# Patient Record
Sex: MALE | URBAN - METROPOLITAN AREA
[De-identification: names, ages, dates, MRNs, and addresses within clinical notes are randomized per-mention and may not be internally consistent; named-entity substitution may affect disease eponyms.]

---

## 2017-09-12 ENCOUNTER — OFFICE VISIT (OUTPATIENT)
Dept: FAMILY MEDICINE CLINIC | Facility: CLINIC | Age: 42
End: 2017-09-12

## 2017-09-12 VITALS
RESPIRATION RATE: 18 BRPM | WEIGHT: 290 LBS | DIASTOLIC BLOOD PRESSURE: 92 MMHG | BODY MASS INDEX: 38.43 KG/M2 | OXYGEN SATURATION: 97 % | SYSTOLIC BLOOD PRESSURE: 132 MMHG | TEMPERATURE: 98 F | HEART RATE: 69 BPM | HEIGHT: 73 IN

## 2017-09-12 DIAGNOSIS — R20.3 SENSITIVE SKIN: ICD-10-CM

## 2017-09-12 DIAGNOSIS — R03.0 ELEVATED BLOOD PRESSURE READING: ICD-10-CM

## 2017-09-12 DIAGNOSIS — N48.29 FORESKIN INFLAMMATION: ICD-10-CM

## 2017-09-12 DIAGNOSIS — R30.0 DYSURIA: ICD-10-CM

## 2017-09-12 DIAGNOSIS — N48.1 BALANITIS: Primary | ICD-10-CM

## 2017-09-12 DIAGNOSIS — R81 GLUCOSE FOUND IN URINE ON EXAMINATION: ICD-10-CM

## 2017-09-12 DIAGNOSIS — R31.9 HEMATURIA, UNSPECIFIED TYPE: ICD-10-CM

## 2017-09-12 PROBLEM — I10 HYPERTENSION: Status: ACTIVE | Noted: 2017-09-12

## 2017-09-12 PROBLEM — E11.9 TYPE 2 DIABETES MELLITUS (HCC): Status: ACTIVE | Noted: 2017-09-12

## 2017-09-12 LAB
APPEARANCE: CLEAR
BILIRUBIN: NEGATIVE
GLUCOSE (URINE DIPSTICK): 500 MG/DL
KETONES (URINE DIPSTICK): NEGATIVE MG/DL
LEUKOCYTES: NEGATIVE
MULTISTIX LOT#: ABNORMAL NUMERIC
NITRITE, URINE: NEGATIVE
PH, URINE: 5.5 (ref 4.5–8)
POC GLUCOSE: 147
PROTEIN (URINE DIPSTICK): 100 MG/DL
SPECIFIC GRAVITY: 1.02 (ref 1–1.03)
URINE-COLOR: YELLOW
UROBILINOGEN,SEMI-QN: 0.2 MG/DL (ref 0–1.9)

## 2017-09-12 PROCEDURE — 81003 URINALYSIS AUTO W/O SCOPE: CPT | Performed by: NURSE PRACTITIONER

## 2017-09-12 PROCEDURE — 87086 URINE CULTURE/COLONY COUNT: CPT | Performed by: NURSE PRACTITIONER

## 2017-09-12 PROCEDURE — 99202 OFFICE O/P NEW SF 15 MIN: CPT | Performed by: NURSE PRACTITIONER

## 2017-09-12 RX ORDER — CLOTRIMAZOLE 1 %
1 CREAM (GRAM) TOPICAL 2 TIMES DAILY
Qty: 1 TUBE | Refills: 0 | Status: SHIPPED | OUTPATIENT
Start: 2017-09-12 | End: 2017-09-19

## 2017-09-12 RX ORDER — LISINOPRIL 10 MG/1
10 TABLET ORAL
Refills: 11 | COMMUNITY
Start: 2017-07-23

## 2017-09-12 RX ORDER — DULAGLUTIDE 1.5 MG/.5ML
1.5 INJECTION, SOLUTION SUBCUTANEOUS WEEKLY
Refills: 1 | COMMUNITY
Start: 2017-07-31

## 2017-09-12 NOTE — PATIENT INSTRUCTIONS
Follow up with your PCP within 3 days due to blood in urine and for balanitis. Your blood glucose medication may need to be adjusted. Also follow up with your PCP due to elevated blood pressure. If worsening in next one to two days go to ER. · You can't return the retracted foreskin to the forward position. This requires immediate attention!   · Your symptoms get worse  · You have partial or complete blockage of the flow of urine  Date Last Reviewed: 9/1/2016  © 4023-8366 [employer], Follow up with your healthcare provider, or as advised. If you were injured and had blood in your urine, you should have a repeat urine test in 1 to 2 days. Contact your doctor for this test.  A radiologist will review any X-rays that were taken.  You will Talk to your pediatrician regarding the use of this medicine in children. While this drug has been used in young children for selected conditions, precautions do apply. What side effects may I notice from receiving this medicine?   Side effects that usuall If you are using this medicine for athlete's foot be sure to dry your feet carefully after bathing, especially between the toes. Do not wear socks made from wool or synthetic materials like tee or nylon.  Wear clean cotton socks and change them at least o

## 2017-09-12 NOTE — PROGRESS NOTES
CHIEF COMPLAINT:   Patient presents with:  UTI: dysuria - X 2 weeks, no fever, no n/v      HPI:   Zenaida Snow is a 36year old male who presents with symptoms of UTI. Complaining of urinary dysuria for last 2 weeks.  Pt describes it more as sensitivity Smokeless tobacco: Never Used                      Alcohol use: Yes              Comment: socially on weekends        REVIEW OF SYSTEMS:   GENERAL: Denies fever, chills, or body aches  SKIN: no rashes, no skin wounds or ulcers.   EYES:denies blurred visio Dysuria  Hematuria, unspecified type  Elevated blood pressure reading  Glucose found in urine on examination  Foreskin inflammation    PLAN: Meds as listed below. Comfort measures as described in Patient Instructions. Will send urine culture.  Pt will be shweta Also follow up with your PCP due to elevated blood pressure. If worsening in next one to two days go to ER. Balanitis    Balanitis is an inflammation of the head of the penis.  It can happen because of a buildup of germs (bacteria, viruses, Date Last Reviewed: 9/1/2016  © 0588-0355 The [address] [place] [address], [address] [place], [place] [address]. All rights reserved. This information is not intended as a substitute for professional medical care.  Always follow your healthcare professional' Call your healthcare provider right away if any of these occur:  · Bright red blood or blood clots in the urine (if you did not have this before)  · Weakness, dizziness or fainting  · New groin, abdominal, or back pain  · Fever of 100.4ºF (38ºC) or higher, · allergic reactions like skin rash, itching or hives, swelling of the face, lips, or tongue  · skin irritation, burning  What may interact with this medicine? · amphotericin b  · topical products that have nystatin  What if I miss a dose?   If you miss a The patient indicates understanding of these issues and agrees to the plan. The patient is asked to return in 3 days if not better. Call if fever, vomiting, worsening symptoms.

## 2017-09-13 ENCOUNTER — TELEPHONE (OUTPATIENT)
Dept: FAMILY MEDICINE CLINIC | Facility: CLINIC | Age: 42
End: 2017-09-13

## 2017-09-13 LAB
C TRACH DNA SPEC QL NAA+PROBE: NEGATIVE
N GONORRHOEA DNA SPEC QL NAA+PROBE: NEGATIVE

## 2017-09-13 NOTE — TELEPHONE ENCOUNTER
Reported GC lab results to patient, informed patient urine culture results are still pending.   Patient had no questions or concerns at this time,

## 2017-09-14 ENCOUNTER — TELEPHONE (OUTPATIENT)
Dept: FAMILY MEDICINE CLINIC | Facility: CLINIC | Age: 42
End: 2017-09-14

## 2017-09-14 NOTE — TELEPHONE ENCOUNTER
Reported WNL to patient. Patient reports he is feeling better and has an appointment to see his PCP. Instructed patient to continue care discussed at visit and follow up with questions or concerns.  Patient agreed with plan and verbalized understanding of a

## 2024-10-31 ENCOUNTER — OFFICE VISIT (OUTPATIENT)
Dept: FAMILY MEDICINE CLINIC | Facility: CLINIC | Age: 49
End: 2024-10-31

## 2024-10-31 VITALS
DIASTOLIC BLOOD PRESSURE: 80 MMHG | HEIGHT: 72 IN | SYSTOLIC BLOOD PRESSURE: 118 MMHG | BODY MASS INDEX: 36.57 KG/M2 | HEART RATE: 63 BPM | WEIGHT: 270 LBS

## 2024-10-31 DIAGNOSIS — I10 ESSENTIAL HYPERTENSION: ICD-10-CM

## 2024-10-31 DIAGNOSIS — E66.812 CLASS 2 OBESITY DUE TO EXCESS CALORIES WITHOUT SERIOUS COMORBIDITY WITH BODY MASS INDEX (BMI) OF 36.0 TO 36.9 IN ADULT: ICD-10-CM

## 2024-10-31 DIAGNOSIS — E11.9 TYPE 2 DIABETES MELLITUS WITHOUT COMPLICATION, WITHOUT LONG-TERM CURRENT USE OF INSULIN (HCC): Primary | ICD-10-CM

## 2024-10-31 DIAGNOSIS — Z12.11 SCREENING FOR MALIGNANT NEOPLASM OF COLON: ICD-10-CM

## 2024-10-31 DIAGNOSIS — E66.09 CLASS 2 OBESITY DUE TO EXCESS CALORIES WITHOUT SERIOUS COMORBIDITY WITH BODY MASS INDEX (BMI) OF 36.0 TO 36.9 IN ADULT: ICD-10-CM

## 2024-10-31 DIAGNOSIS — Z12.5 SCREENING FOR MALIGNANT NEOPLASM OF PROSTATE: ICD-10-CM

## 2024-10-31 PROBLEM — E11.65 TYPE 2 DIABETES MELLITUS WITH HYPERGLYCEMIA, WITHOUT LONG-TERM CURRENT USE OF INSULIN (HCC): Status: ACTIVE | Noted: 2024-10-31

## 2024-10-31 PROBLEM — E11.65 TYPE 2 DIABETES MELLITUS WITH HYPERGLYCEMIA, WITHOUT LONG-TERM CURRENT USE OF INSULIN (HCC): Status: RESOLVED | Noted: 2024-10-31 | Resolved: 2024-10-31

## 2024-10-31 LAB — HEMOGLOBIN A1C: 7.1 % (ref 4.3–5.6)

## 2024-10-31 PROCEDURE — 3008F BODY MASS INDEX DOCD: CPT | Performed by: PHYSICIAN ASSISTANT

## 2024-10-31 PROCEDURE — 3074F SYST BP LT 130 MM HG: CPT | Performed by: PHYSICIAN ASSISTANT

## 2024-10-31 PROCEDURE — 3051F HG A1C>EQUAL 7.0%<8.0%: CPT | Performed by: PHYSICIAN ASSISTANT

## 2024-10-31 PROCEDURE — 3079F DIAST BP 80-89 MM HG: CPT | Performed by: PHYSICIAN ASSISTANT

## 2024-10-31 PROCEDURE — 83036 HEMOGLOBIN GLYCOSYLATED A1C: CPT | Performed by: PHYSICIAN ASSISTANT

## 2024-10-31 PROCEDURE — 99203 OFFICE O/P NEW LOW 30 MIN: CPT | Performed by: PHYSICIAN ASSISTANT

## 2024-10-31 RX ORDER — TAMSULOSIN HYDROCHLORIDE 0.4 MG/1
0.4 CAPSULE ORAL DAILY
Qty: 90 CAPSULE | Refills: 1 | Status: SHIPPED | OUTPATIENT
Start: 2024-10-31 | End: 2025-01-29

## 2024-10-31 RX ORDER — TIRZEPATIDE 5 MG/.5ML
5 INJECTION, SOLUTION SUBCUTANEOUS
Qty: 6 ML | Refills: 1 | Status: SHIPPED | OUTPATIENT
Start: 2024-10-31 | End: 2024-10-31

## 2024-10-31 RX ORDER — INSULIN DEGLUDEC 200 U/ML
30 INJECTION, SOLUTION SUBCUTANEOUS
COMMUNITY
Start: 2024-10-31

## 2024-10-31 RX ORDER — TIRZEPATIDE 7.5 MG/.5ML
7.5 INJECTION, SOLUTION SUBCUTANEOUS
Qty: 6 ML | Refills: 1 | Status: SHIPPED | OUTPATIENT
Start: 2024-10-31 | End: 2025-01-29

## 2024-10-31 NOTE — PROGRESS NOTES
HPI:     HPI  A 49-year-old male is in the office for established care. The patient is doing fine at this time. The patient denies chest pain, SOB, N/V/C/D, fever, dizziness, syncope, and abdominal pain. There are no other concerns today.    Medications:     Current Outpatient Medications   Medication Sig Dispense Refill    tamsulosin (FLOMAX) 0.4 MG Oral Cap Take 1 capsule (0.4 mg total) by mouth daily. 90 capsule 1    Tirzepatide (MOUNJARO) 7.5 MG/0.5ML Subcutaneous Solution Auto-injector Inject 7.5 mg into the skin every 7 days. 6 mL 1    insulin degludec (TRESIBA FLEXTOUCH) 200 UNIT/ML Subcutaneous Solution Pen-injector Inject 30 Units into the skin daily with breakfast.      lisinopril 10 MG Oral Tab Take 1 tablet (10 mg total) by mouth once daily.  11    Dapagliflozin-Metformin HCl ER (XIGDUO XR)  MG Oral Tablet 24 Hr Take 1 tablet by mouth daily.         Allergies:   Allergies[1]    History:     Health Maintenance   Topic Date Due    Diabetes Care Foot Exam  Never done    Diabetes Care A1C  Never done    Diabetes Care Dilated Eye Exam  Never done    Colorectal Cancer Screening  Never done    Diabetes Care: GFR  Never done    Diabetes Care: Microalb/Creat Ratio  Never done    Annual Physical  Never done    Pneumococcal Vaccine: Birth to 64yrs (1 of 2 - PCV) Never done    PSA  Never done    Zoster Vaccines (1 of 2) Never done    COVID-19 Vaccine (1 - 2023-24 season) Never done    Influenza Vaccine (1) 10/01/2024    DTaP,Tdap,and Td Vaccines (2 - Td or Tdap) 02/16/2026    Annual Depression Screening  Completed       No LMP for male patient.   Past Medical History:     Past Medical History:    Diabetes (HCC)    Essential hypertension       Past Surgical History:     Past Surgical History:   Procedure Laterality Date    Cholecystectomy         Family History:     Family History   Problem Relation Age of Onset    Heart Attack Father        Social History:     Social History     Socioeconomic History     Marital status:      Spouse name: Not on file    Number of children: Not on file    Years of education: Not on file    Highest education level: Not on file   Occupational History    Not on file   Tobacco Use    Smoking status: Never    Smokeless tobacco: Never   Vaping Use    Vaping status: Not on file   Substance and Sexual Activity    Alcohol use: Yes     Comment: socially on weekends    Drug use: No    Sexual activity: Not on file   Other Topics Concern    Caffeine Concern Not Asked    Exercise Not Asked    Seat Belt Not Asked    Special Diet Not Asked    Stress Concern Not Asked    Weight Concern Not Asked   Social History Narrative    Not on file     Social Drivers of Health     Financial Resource Strain: Not on file   Food Insecurity: Not on file   Transportation Needs: Not on file   Physical Activity: Not on file   Stress: Not on file   Social Connections: Not on file   Housing Stability: Not on file       Review of Systems:   Review of Systems   Constitutional:  Negative for activity change, chills, fatigue and fever.   HENT:  Negative for congestion, ear discharge, ear pain, postnasal drip, rhinorrhea, sinus pressure, sinus pain and sore throat.    Respiratory:  Negative for cough, chest tightness, shortness of breath and wheezing.    Cardiovascular:  Negative for chest pain and palpitations.   Gastrointestinal:  Negative for abdominal distention, abdominal pain, blood in stool, constipation, diarrhea, nausea and vomiting.   Skin:  Negative for rash.        Vitals:    10/31/24 0900   BP: 118/80   Pulse: 63   Weight: 270 lb (122.5 kg)   Height: 6' (1.829 m)     Body mass index is 36.62 kg/m².    Physical Exam:   Physical Exam  Vitals reviewed.   Cardiovascular:      Rate and Rhythm: Normal rate and regular rhythm.      Heart sounds: Normal heart sounds.   Pulmonary:      Effort: Pulmonary effort is normal.      Breath sounds: Normal breath sounds.          Assessment and Plan::     Problem List Items  Addressed This Visit          HCC Problems    Type 2 diabetes mellitus without complication, without long-term current use of insulin (HCC) - Primary    Relevant Medications    Tirzepatide (MOUNJARO) 7.5 MG/0.5ML Subcutaneous Solution Auto-injector    insulin degludec (TRESIBA FLEXTOUCH) 200 UNIT/ML Subcutaneous Solution Pen-injector  Discussed HbA1C result with the patient. Continue with Xigduo every day. Increase Mounjaro 7.5 mg Qweek. Decrease Tresiba: 30 units QAM.         Cardiac and Vasculature    Essential hypertension    Relevant Medications    Stable. Continue current management.         Endocrine and Metabolic    Class 2 obesity due to excess calories without serious comorbidity with body mass index (BMI) of 36.0 to 36.9 in adult    Relevant Medications            Other Relevant Orders    CBC With Differential With Platelet    Comp Metabolic Panel (14)    Lipid Panel    PSA Total, Screen    TSH W Reflex To Free T4    Microalb/Creat Ratio, Random Urine     Other Visit Diagnoses       Screening for malignant neoplasm of colon        Relevant Orders    Gastro GI Telephone Colon Screen    Screening for malignant neoplasm of prostate        Relevant Orders    PSA Total, Screen          Discussed plan of care with pt and pt is in agreement.All questions answered. Pt to call with questions or concerns.         [1]   Allergies  Allergen Reactions    Shrimp ANAPHYLAXIS

## 2024-11-02 ENCOUNTER — LAB ENCOUNTER (OUTPATIENT)
Dept: LAB | Age: 49
End: 2024-11-02
Attending: PHYSICIAN ASSISTANT
Payer: COMMERCIAL

## 2024-11-02 DIAGNOSIS — E66.09 CLASS 2 OBESITY DUE TO EXCESS CALORIES WITHOUT SERIOUS COMORBIDITY WITH BODY MASS INDEX (BMI) OF 36.0 TO 36.9 IN ADULT: ICD-10-CM

## 2024-11-02 DIAGNOSIS — R74.8 ELEVATED LIVER ENZYMES: ICD-10-CM

## 2024-11-02 DIAGNOSIS — Z12.5 SCREENING FOR MALIGNANT NEOPLASM OF PROSTATE: ICD-10-CM

## 2024-11-02 DIAGNOSIS — E11.9 TYPE 2 DIABETES MELLITUS WITHOUT COMPLICATION, WITHOUT LONG-TERM CURRENT USE OF INSULIN (HCC): ICD-10-CM

## 2024-11-02 DIAGNOSIS — E66.812 CLASS 2 OBESITY DUE TO EXCESS CALORIES WITHOUT SERIOUS COMORBIDITY WITH BODY MASS INDEX (BMI) OF 36.0 TO 36.9 IN ADULT: ICD-10-CM

## 2024-11-02 LAB
ALBUMIN SERPL-MCNC: 4.1 G/DL (ref 3.2–4.8)
ALBUMIN/GLOB SERPL: 1 {RATIO} (ref 1–2)
ALP LIVER SERPL-CCNC: 98 U/L
ALT SERPL-CCNC: 78 U/L
ANION GAP SERPL CALC-SCNC: 4 MMOL/L (ref 0–18)
AST SERPL-CCNC: 53 U/L (ref ?–34)
BASOPHILS # BLD AUTO: 0.11 X10(3) UL (ref 0–0.2)
BASOPHILS NFR BLD AUTO: 1.4 %
BILIRUB SERPL-MCNC: 0.7 MG/DL (ref 0.3–1.2)
BUN BLD-MCNC: 12 MG/DL (ref 9–23)
BUN/CREAT SERPL: 14.5 (ref 10–20)
CALCIUM BLD-MCNC: 9.7 MG/DL (ref 8.7–10.4)
CHLORIDE SERPL-SCNC: 107 MMOL/L (ref 98–112)
CHOLEST SERPL-MCNC: 180 MG/DL (ref ?–200)
CO2 SERPL-SCNC: 28 MMOL/L (ref 21–32)
COMPLEXED PSA SERPL-MCNC: 0.33 NG/ML (ref ?–4)
CREAT BLD-MCNC: 0.83 MG/DL
CREAT UR-SCNC: 80.5 MG/DL
DEPRECATED RDW RBC AUTO: 43.9 FL (ref 35.1–46.3)
EGFRCR SERPLBLD CKD-EPI 2021: 107 ML/MIN/1.73M2 (ref 60–?)
EOSINOPHIL # BLD AUTO: 0.39 X10(3) UL (ref 0–0.7)
EOSINOPHIL NFR BLD AUTO: 5 %
ERYTHROCYTE [DISTWIDTH] IN BLOOD BY AUTOMATED COUNT: 12.3 % (ref 11–15)
FASTING PATIENT LIPID ANSWER: YES
FASTING STATUS PATIENT QL REPORTED: YES
GLOBULIN PLAS-MCNC: 4.2 G/DL (ref 2–3.5)
GLUCOSE BLD-MCNC: 136 MG/DL (ref 70–99)
HCT VFR BLD AUTO: 49.8 %
HDLC SERPL-MCNC: 32 MG/DL (ref 40–59)
HGB BLD-MCNC: 17 G/DL
IMM GRANULOCYTES # BLD AUTO: 0.03 X10(3) UL (ref 0–1)
IMM GRANULOCYTES NFR BLD: 0.4 %
LDLC SERPL CALC-MCNC: 124 MG/DL (ref ?–100)
LYMPHOCYTES # BLD AUTO: 2.07 X10(3) UL (ref 1–4)
LYMPHOCYTES NFR BLD AUTO: 26.7 %
MCH RBC QN AUTO: 32.9 PG (ref 26–34)
MCHC RBC AUTO-ENTMCNC: 34.1 G/DL (ref 31–37)
MCV RBC AUTO: 96.5 FL
MICROALBUMIN UR-MCNC: 0.9 MG/DL
MICROALBUMIN/CREAT 24H UR-RTO: 11.2 UG/MG (ref ?–30)
MONOCYTES # BLD AUTO: 0.64 X10(3) UL (ref 0.1–1)
MONOCYTES NFR BLD AUTO: 8.3 %
NEUTROPHILS # BLD AUTO: 4.5 X10 (3) UL (ref 1.5–7.7)
NEUTROPHILS # BLD AUTO: 4.5 X10(3) UL (ref 1.5–7.7)
NEUTROPHILS NFR BLD AUTO: 58.2 %
NONHDLC SERPL-MCNC: 148 MG/DL (ref ?–130)
OSMOLALITY SERPL CALC.SUM OF ELEC: 290 MOSM/KG (ref 275–295)
PLATELET # BLD AUTO: 219 10(3)UL (ref 150–450)
POTASSIUM SERPL-SCNC: 4.5 MMOL/L (ref 3.5–5.1)
PROT SERPL-MCNC: 8.3 G/DL (ref 5.7–8.2)
RBC # BLD AUTO: 5.16 X10(6)UL
SODIUM SERPL-SCNC: 139 MMOL/L (ref 136–145)
TRIGL SERPL-MCNC: 131 MG/DL (ref 30–149)
TSI SER-ACNC: 0.6 UIU/ML (ref 0.55–4.78)
VLDLC SERPL CALC-MCNC: 23 MG/DL (ref 0–30)
WBC # BLD AUTO: 7.7 X10(3) UL (ref 4–11)

## 2024-11-02 PROCEDURE — 82043 UR ALBUMIN QUANTITATIVE: CPT

## 2024-11-02 PROCEDURE — 80061 LIPID PANEL: CPT

## 2024-11-02 PROCEDURE — 85025 COMPLETE CBC W/AUTO DIFF WBC: CPT

## 2024-11-02 PROCEDURE — 82570 ASSAY OF URINE CREATININE: CPT

## 2024-11-02 PROCEDURE — 36415 COLL VENOUS BLD VENIPUNCTURE: CPT

## 2024-11-02 PROCEDURE — 86706 HEP B SURFACE ANTIBODY: CPT

## 2024-11-02 PROCEDURE — 87340 HEPATITIS B SURFACE AG IA: CPT

## 2024-11-02 PROCEDURE — 86803 HEPATITIS C AB TEST: CPT

## 2024-11-02 PROCEDURE — 80503 PATH CLIN CONSLTJ SF 5-20: CPT

## 2024-11-02 PROCEDURE — 86708 HEPATITIS A ANTIBODY: CPT

## 2024-11-02 PROCEDURE — 84443 ASSAY THYROID STIM HORMONE: CPT

## 2024-11-02 PROCEDURE — 80053 COMPREHEN METABOLIC PANEL: CPT

## 2024-11-02 PROCEDURE — 86704 HEP B CORE ANTIBODY TOTAL: CPT

## 2024-11-03 LAB
HAV AB SER QL IA: NONREACTIVE
HBV CORE AB SERPL QL IA: NONREACTIVE
HBV SURFACE AB SER QL: NONREACTIVE
HBV SURFACE AB SERPL IA-ACNC: <3.1 MIU/ML
HBV SURFACE AG SERPL QL IA: NONREACTIVE
HCV AB SERPL QL IA: NONREACTIVE

## 2024-11-06 ENCOUNTER — NURSE ONLY (OUTPATIENT)
Facility: CLINIC | Age: 49
End: 2024-11-06

## 2024-11-06 DIAGNOSIS — Z12.11 COLON CANCER SCREENING: Primary | ICD-10-CM

## 2024-11-06 NOTE — PROGRESS NOTES
Called patient for TCS.   Please advise on colonoscopy and bowel prep orders.   Medications, pharmacy, and allergies reviewed.     Age 45-74 y/o:   › MD preference: none  › Insurance:  BCBS PPO  › Last PCP visit: 10/31/2024 Ignacia Barnett  › Last CBC: 11/02/2024  › H/W/BMI: 6'/270/36.62    Special comments/notes:    Telephone Colon Screening Questionnaire Yes No   Are you currently experiencing any new/abnormal GI symptoms? [] [x]   If yes, explain:     Rectal bleeding? [] [x]   Black stool? [] [x]   Dysphagia or food \"feeling stuck\" when eating? [] [x]   Intractable vomiting? [] [x]   Unexplained weight loss? [] [x]   First colonoscopy? [x] []   Family history of colon cancer? [] [x]   Any issues with anesthesia? [] [x]   If yes, explain:      Any recent complaints related to chest pain &/or shortness of breath?  [] [x]   Were you referred to a cardiologist?  [x] []   If yes, explain: Had a stress test 2 years ago that was normal     History of  respiratory issues/oxygen/GASPER/COPD: [] [x]   CPAP/BiPAP:     History of devices (pacemaker/defibrillator) [] [x]   History of heart attack &/or stroke?  [] [x]   If yes, in the last 12 months? Stent placement?  [] []     Medications Yes  No   Anticoagulants (except Aspirin):  [] [x]   Diabetic Medication Xigduo, Mounjaro Tresiba [x] []   Weight loss meds (phentermine/vyvanse/saxsenda/etc): [] [x]   Iron/herbal/multivitamin supplement: [] [x]   Usage of marijuana, CBD &/or vape products: [] [x]

## 2024-11-06 NOTE — PROGRESS NOTES
1. Schedule colonoscopy with General Pool Endoscopist  Diagnosis: CRC screening  Sedation: MAC  Prep: split dose golytely    2. MEDICATION CHANGES PRIOR TO PROCEDURE    *HOLD xigduo for 4 days prior to procedure  *HOLD mounjaro for 1 week prior to procedure  If you are prescribed insulin please reach out to your endocrine provider (or primary care provider) for insulin dosing prior to procedure. Failure to adjust insulin can result in procedure cancellations.

## 2024-11-07 NOTE — PROGRESS NOTES
Scheduled for:  Colonoscopy [id number]  Provider Name:    Date:  Thursday, 03/13/2025  Location:  [facility]   Sedation:  Mac  Time:  215pm (pt is aware [facility] will call with arrival time     Prep:  Golytely   Meds/Allergies Reconciled?:  Yes    Diagnosis with codes:  CRC screening Z12.11  Was patient informed to call insurance with codes (Y/N):  Yes     Referral sent?:  Referral was sent at the time of electronic surgical scheduling.    [facility] or [facility] notified?:  I sent an electronic request to Endo Scheduling and received a confirmation today.       Medication Orders:  *HOLD xigduo for 4 days prior to procedure  *HOLD mounjaro for 1 week prior to procedure  If you are prescribed insulin please reach out to your endocrine provider (or primary care provider) for insulin dosing prior to procedure. Failure to adjust insulin can result in procedure cancellations.     Misc Orders:  None     Further instructions given by staff:  I discussed the prep instructions with the patient which he verbally understood and is aware that I will send the instructions today.via [employer]

## 2025-01-23 ENCOUNTER — OFFICE VISIT (OUTPATIENT)
Dept: FAMILY MEDICINE CLINIC | Facility: CLINIC | Age: 50
End: 2025-01-23

## 2025-01-23 VITALS
BODY MASS INDEX: 38.47 KG/M2 | HEIGHT: 72 IN | SYSTOLIC BLOOD PRESSURE: 113 MMHG | HEART RATE: 80 BPM | WEIGHT: 284 LBS | DIASTOLIC BLOOD PRESSURE: 78 MMHG

## 2025-01-23 DIAGNOSIS — L03.818 CELLULITIS OF OTHER SPECIFIED SITE: Primary | ICD-10-CM

## 2025-01-23 PROCEDURE — 3074F SYST BP LT 130 MM HG: CPT | Performed by: FAMILY MEDICINE

## 2025-01-23 PROCEDURE — 3008F BODY MASS INDEX DOCD: CPT | Performed by: FAMILY MEDICINE

## 2025-01-23 PROCEDURE — 99213 OFFICE O/P EST LOW 20 MIN: CPT | Performed by: FAMILY MEDICINE

## 2025-01-23 PROCEDURE — 3078F DIAST BP <80 MM HG: CPT | Performed by: FAMILY MEDICINE

## 2025-01-23 RX ORDER — CEPHALEXIN 500 MG/1
500 CAPSULE ORAL 4 TIMES DAILY
Qty: 28 CAPSULE | Refills: 0 | Status: SHIPPED | OUTPATIENT
Start: 2025-01-23 | End: 2025-01-30

## 2025-01-23 NOTE — PROGRESS NOTES
HPI:   Hill Gandhi is a 49 year old male who presents for:     Patient presents with:    Recurrent/intermittent skin redness and pain suprapubic area on the right, occurred in the area where he had a reaction to topical, he has been using betamethasone cream given to him in the past for reaction to belt buckle, he states that he has not worn a belt buckle for a long time due to his reaction, he has not been seen for this otherwise including he has not been on antibiotic or treated for infection, no fever or chills, no discharge          See ROS below for other pertinent positive and negative complaints.    I reviewed his's Past Medical History, Past Surgical History, Family and Social History    Labs:   Lab Results   Component Value Date/Time    WBC 7.7 11/02/2024 10:06 AM    HGB 17.0 11/02/2024 10:06 AM    .0 11/02/2024 10:06 AM      Lab Results   Component Value Date/Time     (H) 11/02/2024 10:06 AM     11/02/2024 10:06 AM    K 4.5 11/02/2024 10:06 AM     11/02/2024 10:06 AM    CO2 28.0 11/02/2024 10:06 AM    CREATSERUM 0.83 11/02/2024 10:06 AM    CA 9.7 11/02/2024 10:06 AM    ALB 4.1 11/02/2024 10:06 AM    TP 8.3 (H) 11/02/2024 10:06 AM    ALKPHO 98 11/02/2024 10:06 AM    AST 53 (H) 11/02/2024 10:06 AM    ALT 78 (H) 11/02/2024 10:06 AM    BILT 0.7 11/02/2024 10:06 AM    TSH 0.602 11/02/2024 10:06 AM        Lab Results   Component Value Date/Time    CHOLEST 180 11/02/2024 10:06 AM    HDL 32 (L) 11/02/2024 10:06 AM    TRIG 131 11/02/2024 10:06 AM     (H) 11/02/2024 10:06 AM    NONHDLC 148 (H) 11/02/2024 10:06 AM           Current Outpatient Medications   Medication Sig Dispense Refill    cephALEXin 500 MG Oral Cap Take 1 capsule (500 mg total) by mouth 4 (four) times daily for 7 days. 28 capsule 0    atorvastatin (LIPITOR) 10 MG Oral Tab Take 1 tablet (10 mg total) by mouth daily. 90 tablet 3    tamsulosin (FLOMAX) 0.4 MG Oral Cap Take 1 capsule (0.4 mg total) by mouth daily. 90  capsule 1    Tirzepatide (MOUNJARO) 7.5 MG/0.5ML Subcutaneous Solution Auto-injector Inject 7.5 mg into the skin every 7 days. 6 mL 1    lisinopril 10 MG Oral Tab Take 1 tablet (10 mg total) by mouth once daily.  11    Dapagliflozin-Metformin HCl ER (XIGDUO XR)  MG Oral Tablet 24 Hr Take 1 tablet by mouth daily.      polyethylene glycol, PEG 3350-KCl-NaBcb-NaCl-NaSulf, 236 g Oral Recon Soln Take 4,000 mL by mouth As Directed. Take 2,000 mL the night before your procedure and 2,000 mL the morning of your procedure. Take as directed by GI clinic. Okay to substitute for generic. 1 each 0    insulin degludec (TRESIBA FLEXTOUCH) 200 UNIT/ML Subcutaneous Solution Pen-injector Inject 30 Units into the skin daily with breakfast.        Past Medical History:    Diabetes (HCC)    Essential hypertension      Past Surgical History:   Procedure Laterality Date    Cholecystectomy        Family History   Problem Relation Age of Onset    Heart Attack Father      Allergies[1]   Social History:  Social History     Socioeconomic History    Marital status:    Tobacco Use    Smoking status: Never    Smokeless tobacco: Never   Substance and Sexual Activity    Alcohol use: Yes     Comment: socially on weekends    Drug use: No         REVIEW OF SYSTEMS:   Review of Systems   Constitutional: Negative.  Negative for fatigue.   HENT: Negative.     Eyes: Negative.    Respiratory: Negative.     Cardiovascular: Negative.    Gastrointestinal: Negative.    Genitourinary: Negative.    Musculoskeletal:  Positive for arthralgias (heel pain). Negative for joint swelling and myalgias.   Skin:  Negative for wound.   Neurological: Negative.    Hematological: Negative.    Psychiatric/Behavioral: Negative.         EXAM:   /78   Pulse 80   Ht 6' (1.829 m)   Wt 284 lb (128.8 kg)   BMI 38.52 kg/m²  Body mass index is 38.52 kg/m².  Physical Exam  Vitals and nursing note reviewed.   Constitutional:       General: He is not in acute  distress.     Appearance: Normal appearance. He is obese. He is not ill-appearing, toxic-appearing or diaphoretic.   HENT:      Head: Normocephalic.   Eyes:      Conjunctiva/sclera: Conjunctivae normal.   Pulmonary:      Effort: Pulmonary effort is normal.   Musculoskeletal:      Cervical back: Normal range of motion and neck supple.   Lymphadenopathy:      Cervical: No cervical adenopathy.   Skin:     General: Skin is warm and dry.      Capillary Refill: Capillary refill takes less than 2 seconds.      Findings: Erythema and lesion present.      Comments: Suprapubic region, right of midline, approximately 2 x 5 cm erythematous area, slightly raised, no discharge, no pustule, no cysts or nodules palpated   Neurological:      Mental Status: He is alert and oriented to person, place, and time.   Psychiatric:         Mood and Affect: Mood normal.         Behavior: Behavior normal.         ASSESSMENT AND PLAN:   1. Hill Gandhi is a 49 year old male who presents for:    1. Cellulitis of other specified site, suprapubic area, at site of previous contact dermatitis (from wearing belt buckle, patient has not worn this for a long time due to his reaction), no signs of abscess at this time, may have opened and drained vs?  Cephalexin 500 mg 4 times daily for 7 days  See additional recommendations and follow-up below    - cephALEXin 500 MG Oral Cap; Take 1 capsule (500 mg total) by mouth 4 (four) times daily for 7 days.  Dispense: 28 capsule; Refill: 0    Patient Instructions   Recommend:    Keeping the affected area clean and dry, apply a gauze barrier between your clothing and the skin infection.    Medication(s), cephalexin, as prescribed.  Do not apply the betamethasone cream at this time.    Follow-up in the next week for a recheck, or sooner if no improvement or symptoms are worsening.      Follow-up for any signs of worsening infection (such as fever, chills, worsening pain, redness, swelling, warmth and  discharge).    Go to the emergency room or call 911 for any new or suddenly worsening symptoms, any signs of acute distress, respiratory distress or emergent changes.      Follow up: as above (See AVS)    All questions were answered.  We discussed the indications, proper use, risks, and benefits of the above recommendations including any medication(s) as prescribed.  The patient (and/or guardian or parent if less than 18 years old) indicate(s) understanding and agree(s) to the above plan of care.    No orders of the defined types were placed in this encounter.    Meds & Refills for this Visit:  Requested Prescriptions     Signed Prescriptions Disp Refills    cephALEXin 500 MG Oral Cap 28 capsule 0     Sig: Take 1 capsule (500 mg total) by mouth 4 (four) times daily for 7 days.     Other Orders, Imaging & Consults:  None    Ritu Ocampo MD       [1]   Allergies  Allergen Reactions    Shrimp ANAPHYLAXIS

## 2025-01-23 NOTE — PATIENT INSTRUCTIONS
Recommend:    Keeping the affected area clean and dry, apply a gauze barrier between your clothing and the skin infection.    Medication(s), cephalexin, as prescribed.  Do not apply the betamethasone cream at this time.    Follow-up in the next week for a recheck, or sooner if no improvement or symptoms are worsening.      Follow-up for any signs of worsening infection (such as fever, chills, worsening pain, redness, swelling, warmth and discharge).    Go to the emergency room or call 911 for any new or suddenly worsening symptoms, any signs of acute distress, respiratory distress or emergent changes.

## 2025-01-30 ENCOUNTER — OFFICE VISIT (OUTPATIENT)
Dept: FAMILY MEDICINE CLINIC | Facility: CLINIC | Age: 50
End: 2025-01-30

## 2025-01-30 ENCOUNTER — LAB ENCOUNTER (OUTPATIENT)
Dept: LAB | Age: 50
End: 2025-01-30
Attending: PHYSICIAN ASSISTANT
Payer: COMMERCIAL

## 2025-01-30 VITALS
DIASTOLIC BLOOD PRESSURE: 81 MMHG | WEIGHT: 281 LBS | HEART RATE: 64 BPM | BODY MASS INDEX: 38.06 KG/M2 | SYSTOLIC BLOOD PRESSURE: 122 MMHG | HEIGHT: 72 IN

## 2025-01-30 DIAGNOSIS — T14.8XXA SKIN ABRASION: ICD-10-CM

## 2025-01-30 DIAGNOSIS — L85.3 DRY SKIN: ICD-10-CM

## 2025-01-30 DIAGNOSIS — E11.9 TYPE 2 DIABETES MELLITUS WITHOUT COMPLICATION, WITHOUT LONG-TERM CURRENT USE OF INSULIN (HCC): ICD-10-CM

## 2025-01-30 DIAGNOSIS — E78.2 MIXED HYPERLIPIDEMIA: ICD-10-CM

## 2025-01-30 DIAGNOSIS — E66.812 CLASS 2 OBESITY DUE TO EXCESS CALORIES WITHOUT SERIOUS COMORBIDITY WITH BODY MASS INDEX (BMI) OF 36.0 TO 36.9 IN ADULT: ICD-10-CM

## 2025-01-30 DIAGNOSIS — R74.8 ELEVATED LIVER ENZYMES: ICD-10-CM

## 2025-01-30 DIAGNOSIS — E66.09 CLASS 2 OBESITY DUE TO EXCESS CALORIES WITHOUT SERIOUS COMORBIDITY WITH BODY MASS INDEX (BMI) OF 36.0 TO 36.9 IN ADULT: ICD-10-CM

## 2025-01-30 DIAGNOSIS — E11.9 TYPE 2 DIABETES MELLITUS WITHOUT COMPLICATION, WITHOUT LONG-TERM CURRENT USE OF INSULIN (HCC): Primary | ICD-10-CM

## 2025-01-30 LAB
ALBUMIN SERPL-MCNC: 4.3 G/DL (ref 3.2–4.8)
ALP LIVER SERPL-CCNC: 94 U/L
ALT SERPL-CCNC: 77 U/L
AST SERPL-CCNC: 59 U/L (ref ?–34)
BILIRUB DIRECT SERPL-MCNC: 0.3 MG/DL (ref ?–0.3)
BILIRUB SERPL-MCNC: 0.7 MG/DL (ref 0.3–1.2)
CHOLEST SERPL-MCNC: 174 MG/DL (ref ?–200)
CREAT UR-SCNC: 42.8 MG/DL
FASTING PATIENT LIPID ANSWER: NO
HDLC SERPL-MCNC: 34 MG/DL (ref 40–59)
HEMOGLOBIN A1C: 7.5 % (ref 4.3–5.6)
LDLC SERPL CALC-MCNC: 120 MG/DL (ref ?–100)
MICROALBUMIN UR-MCNC: <0.3 MG/DL
NONHDLC SERPL-MCNC: 140 MG/DL (ref ?–130)
PROT SERPL-MCNC: 8.6 G/DL (ref 5.7–8.2)
TRIGL SERPL-MCNC: 111 MG/DL (ref 30–149)
VLDLC SERPL CALC-MCNC: 19 MG/DL (ref 0–30)

## 2025-01-30 PROCEDURE — 80076 HEPATIC FUNCTION PANEL: CPT

## 2025-01-30 PROCEDURE — 82570 ASSAY OF URINE CREATININE: CPT

## 2025-01-30 PROCEDURE — 3074F SYST BP LT 130 MM HG: CPT | Performed by: PHYSICIAN ASSISTANT

## 2025-01-30 PROCEDURE — 36415 COLL VENOUS BLD VENIPUNCTURE: CPT

## 2025-01-30 PROCEDURE — 3008F BODY MASS INDEX DOCD: CPT | Performed by: PHYSICIAN ASSISTANT

## 2025-01-30 PROCEDURE — 3079F DIAST BP 80-89 MM HG: CPT | Performed by: PHYSICIAN ASSISTANT

## 2025-01-30 PROCEDURE — 83036 HEMOGLOBIN GLYCOSYLATED A1C: CPT | Performed by: PHYSICIAN ASSISTANT

## 2025-01-30 PROCEDURE — 82043 UR ALBUMIN QUANTITATIVE: CPT

## 2025-01-30 PROCEDURE — 80061 LIPID PANEL: CPT

## 2025-01-30 PROCEDURE — 99213 OFFICE O/P EST LOW 20 MIN: CPT | Performed by: PHYSICIAN ASSISTANT

## 2025-01-30 PROCEDURE — 3051F HG A1C>EQUAL 7.0%<8.0%: CPT | Performed by: PHYSICIAN ASSISTANT

## 2025-01-30 RX ORDER — MUPIROCIN 20 MG/G
OINTMENT TOPICAL
Qty: 30 G | Refills: 0 | Status: SHIPPED | OUTPATIENT
Start: 2025-01-30

## 2025-01-30 RX ORDER — TAMSULOSIN HYDROCHLORIDE 0.4 MG/1
CAPSULE ORAL
COMMUNITY
Start: 2025-01-29

## 2025-01-30 RX ORDER — AMMONIUM LACTATE 12 G/100G
1 LOTION TOPICAL AS NEEDED
Qty: 225 G | Refills: 1 | Status: SHIPPED | OUTPATIENT
Start: 2025-01-30

## 2025-01-30 RX ORDER — TIRZEPATIDE 7.5 MG/.5ML
INJECTION, SOLUTION SUBCUTANEOUS
COMMUNITY
Start: 2025-01-24

## 2025-01-30 NOTE — PROGRESS NOTES
HPI:     HPI  A 49-year-old man is in the office for a follow-up. He started using Mounjaro 7.5 mg for the past 2 weeks. His cellulitis is getting better.  The patient denies chest pain, SOB, N/V/C/D, fever, dizziness, syncope, and abdominal pain. There are no other concerns today.      Medications:     Current Outpatient Medications   Medication Sig Dispense Refill    MOUNJARO 7.5 MG/0.5ML Subcutaneous Solution Auto-injector       tamsulosin 0.4 MG Oral Cap       mupirocin 2 % External Ointment Apply to affect lesions tid 30 g 0    ammonium lactate 12 % External Lotion Apply 1 Application topically as needed for Dry Skin. 225 g 1    cephALEXin 500 MG Oral Cap Take 1 capsule (500 mg total) by mouth 4 (four) times daily for 7 days. 28 capsule 0    polyethylene glycol, PEG 3350-KCl-NaBcb-NaCl-NaSulf, 236 g Oral Recon Soln Take 4,000 mL by mouth As Directed. Take 2,000 mL the night before your procedure and 2,000 mL the morning of your procedure. Take as directed by GI clinic. Okay to substitute for generic. 1 each 0    atorvastatin (LIPITOR) 10 MG Oral Tab Take 1 tablet (10 mg total) by mouth daily. 90 tablet 3    insulin degludec (TRESIBA FLEXTOUCH) 200 UNIT/ML Subcutaneous Solution Pen-injector Inject 30 Units into the skin daily with breakfast.      lisinopril 10 MG Oral Tab Take 1 tablet (10 mg total) by mouth once daily.  11    Dapagliflozin-Metformin HCl ER (XIGDUO XR)  MG Oral Tablet 24 Hr Take 1 tablet by mouth daily.         Allergies:   Allergies[1]    History:     Health Maintenance   Topic Date Due    Diabetes Care Dilated Eye Exam  Never done    Colorectal Cancer Screening  Never done    Annual Physical  Never done    Pneumococcal Vaccine: 50+ Years (1 of 2 - PCV) Never done    Zoster Vaccines (1 of 2) Never done    COVID-19 Vaccine (1 - 2024-25 season) Never done    Influenza Vaccine (1) 10/01/2024    Annual Depression Screening  01/01/2025    Diabetes Care: Foot Exam (Annual)  Never done     Diabetes Care: Microalb/Creat Ratio (Annual)  01/01/2025    Diabetes Care A1C  04/30/2025    Diabetes Care: GFR  11/02/2025    DTaP,Tdap,and Td Vaccines (2 - Td or Tdap) 02/16/2026    PSA  11/02/2026    Meningococcal B Vaccine  Aged Out       No LMP for male patient.   Past Medical History:     Past Medical History:    Diabetes (HCC)    Essential hypertension       Past Surgical History:     Past Surgical History:   Procedure Laterality Date    Cholecystectomy         Family History:     Family History   Problem Relation Age of Onset    Heart Attack Father        Social History:     Social History     Socioeconomic History    Marital status:      Spouse name: Not on file    Number of children: Not on file    Years of education: Not on file    Highest education level: Not on file   Occupational History    Not on file   Tobacco Use    Smoking status: Never    Smokeless tobacco: Never   Vaping Use    Vaping status: Not on file   Substance and Sexual Activity    Alcohol use: Yes     Comment: socially on weekends    Drug use: No    Sexual activity: Not on file   Other Topics Concern    Caffeine Concern Not Asked    Exercise Not Asked    Seat Belt Not Asked    Special Diet Not Asked    Stress Concern Not Asked    Weight Concern Not Asked   Social History Narrative    Not on file     Social Drivers of Health     Financial Resource Strain: Not on file   Food Insecurity: Not on file   Transportation Needs: Not on file   Physical Activity: Not on file   Stress: Not on file   Social Connections: Not on file   Housing Stability: Not on file       Review of Systems:   Review of Systems   Constitutional:  Negative for activity change, chills, fatigue and fever.   HENT:  Negative for congestion, ear discharge, ear pain, postnasal drip, rhinorrhea, sinus pressure, sinus pain and sore throat.    Respiratory:  Negative for cough, chest tightness, shortness of breath and wheezing.    Cardiovascular:  Negative for chest pain and  palpitations.   Gastrointestinal:  Negative for abdominal distention, abdominal pain, blood in stool, constipation, diarrhea, nausea and vomiting.   Skin:  Negative for rash.        Vitals:    01/30/25 0908   BP: 122/81   Pulse: 64   Weight: 281 lb (127.5 kg)   Height: 6' (1.829 m)     Body mass index is 38.11 kg/m².    Physical Exam:   Physical Exam  Abdominal:      General: Abdomen is flat. Bowel sounds are normal. There is no distension.      Palpations: Abdomen is soft.      Tenderness: There is no abdominal tenderness.      There are few skin abrasions on the lower abdomen.    Assessment and Plan::     Assessment & Plan  Type 2 diabetes mellitus without complication, without long-term current use of insulin ([facility])    Orders:    POC Glycohemoglobin [94901]    Microalb/Creat Ratio, Random Urine; Future    Hepatic Function Panel (7); Future  Stable. Continue current management.    Bilateral barefoot skin diabetic exam is normal, visualized feet and the appearance is normal.  Bilateral monofilament/sensation of both feet is normal.  Pulsation pedal pulse exam of both lower legs/feet is normal as well.    Diabetes: On Insulin.   A1c is 7.5 done 1/30/2025 which shows hyperglycemia and borderline control, maintain vigilance and increase activity and medications as listed.   DM Meds: Dapagliflozin Pro-metFORMIN ER Tb24 -  MG; Mounjaro Soaj - 7.5 MG/0.5ML; Tresiba FlexTouch Sopn - 200 UNIT/ML   Oral Diabetes Med Adherence Good at 98%    Diabetic Complications: Hyperglycemia: A1c 7.5% 1/30/2025, .         Class 2 obesity due to excess calories without serious comorbidity with body mass index (BMI) of 36.0 to 36.9 in adult    Orders:    Lipid Panel; Future    Hepatic Function Panel (7); Future    Dry skin    Orders:    ammonium lactate 12 % External Lotion; Apply 1 Application topically as needed for Dry Skin.    Skin abrasion    Orders:    mupirocin 2 % External Ointment; Apply to affect lesions tid        Discussed plan of care with pt and pt is in agreement.All questions answered. Pt to call with questions or concerns.         [1]   Allergies  Allergen Reactions    Shrimp ANAPHYLAXIS

## 2025-01-30 NOTE — ASSESSMENT & PLAN NOTE
Orders:    POC Glycohemoglobin [[id number]]    Microalb/Creat Ratio, Random Urine; Future    Hepatic Function Panel (7); Future  Stable. Continue current management.    Bilateral barefoot skin diabetic exam is normal, visualized feet and the appearance is normal.  Bilateral monofilament/sensation of both feet is normal.  Pulsation pedal pulse exam of both lower legs/feet is normal as well.    Diabetes: On Insulin.   A1c is 7.5 done 1/30/2025 which shows hyperglycemia and borderline control, maintain vigilance and increase activity and medications as listed.   DM Meds: Dapagliflozin Pro-metFORMIN ER Tb24 -  MG; Mounjaro Soaj - 7.5 MG/0.5ML; Tresiba FlexTouch Sopn - 200 UNIT/ML   Oral Diabetes Med Adherence Good at 98%    Diabetic Complications: Hyperglycemia: A1c 7.5% 1/30/2025, .

## 2025-01-31 PROBLEM — K76.0 FATTY LIVER: Status: ACTIVE | Noted: 2025-01-31

## 2025-03-13 PROBLEM — K64.8 INTERNAL HEMORRHOIDS: Status: ACTIVE | Noted: 2025-03-13

## 2025-03-13 PROCEDURE — 88305 TISSUE EXAM BY PATHOLOGIST: CPT | Performed by: INTERNAL MEDICINE

## 2025-04-17 DIAGNOSIS — T14.8XXA SKIN ABRASION: ICD-10-CM

## 2025-04-21 ENCOUNTER — TELEPHONE (OUTPATIENT)
Facility: CLINIC | Age: 50
End: 2025-04-21

## 2025-04-21 RX ORDER — MUPIROCIN 2 %
OINTMENT (GRAM) TOPICAL
Qty: 22 G | Refills: 0 | OUTPATIENT
Start: 2025-04-21

## 2025-04-21 NOTE — TELEPHONE ENCOUNTER
----- Message from José Antonio Wilde sent at 4/19/2025 12:03 PM CDT -----  GI RNs - please recall for colonoscopy exam in 7yrs    ----- Message -----  From: Lab, Background User  Sent: 3/14/2025   9:19 AM CDT  To: José Antonio Wilde MD

## 2025-04-21 NOTE — TELEPHONE ENCOUNTER
7  year colonoscopy recall entered. Health maintenance updated.    Colonoscopy done on 3/13/25 and next due on 3/13/32.

## 2025-05-21 DIAGNOSIS — E11.9 TYPE 2 DIABETES MELLITUS WITHOUT COMPLICATION, WITHOUT LONG-TERM CURRENT USE OF INSULIN (HCC): ICD-10-CM

## 2025-05-22 RX ORDER — TIRZEPATIDE 7.5 MG/.5ML
7.5 INJECTION, SOLUTION SUBCUTANEOUS
Qty: 6 ML | Refills: 1 | Status: SHIPPED | OUTPATIENT
Start: 2025-05-22 | End: 2025-08-20

## 2025-05-22 RX ORDER — TIRZEPATIDE 7.5 MG/.5ML
INJECTION, SOLUTION SUBCUTANEOUS
Refills: 1 | OUTPATIENT
Start: 2025-05-22

## 2025-05-22 NOTE — TELEPHONE ENCOUNTER
Refill passes per [facility] protocol.    No future appointments with primary care medicine.    Prescription fails for medication not being active on patient's med list - prescription \"fell off list\" due to duration being entered or directions/qty/refills are different.

## 2025-05-26 NOTE — TELEPHONE ENCOUNTER
Refill passed [facility] protocol.     Please review pended refill request, due to patient listing medication as external / historical.

## 2025-05-27 RX ORDER — DAPAGLIFLOZIN AND METFORMIN HYDROCHLORIDE 10; 1000 MG/1; MG/1
1 TABLET, FILM COATED, EXTENDED RELEASE ORAL DAILY
Qty: 90 TABLET | Refills: 1 | Status: SHIPPED | OUTPATIENT
Start: 2025-05-27

## 2025-07-01 RX ORDER — LISINOPRIL 10 MG/1
10 TABLET ORAL DAILY
Qty: 90 TABLET | Refills: 0 | Status: SHIPPED | OUTPATIENT
Start: 2025-07-01